# Patient Record
Sex: FEMALE | Race: WHITE | URBAN - METROPOLITAN AREA
[De-identification: names, ages, dates, MRNs, and addresses within clinical notes are randomized per-mention and may not be internally consistent; named-entity substitution may affect disease eponyms.]

---

## 2021-12-01 ENCOUNTER — TELEPHONE (OUTPATIENT)
Dept: TRANSPLANT | Facility: CLINIC | Age: 26
End: 2021-12-01

## 2021-12-01 NOTE — TELEPHONE ENCOUNTER
Initial Independent Living Donor Advocate contact made with potential donor today.  I introduced myself and my role during the donation process, includin.  MARY ROLE   The federal government requires that all licensed transplant centers provide the living donor with an Independent Living Donor Advocate (MARY).  I do not meet recipients or attend meetings that discuss their care or decision to transplant them. My role is separate to avoid any conflict of interest.  My role is to ensure:  1) your rights are protected;  2) you get all the information you need from the transplant team to make a fully informed decision whether to donate;   3) that living donation is in your best interest.   4) that you have the right to decide NOT to go forward with living donation at any time during this process.  I am available to you throughout the workup, during surgery phase and follow-up at home.   2. WORKUP & PRIVACY     Your identity and workup are not shared with the recipient at any time.     There is a medical donor workup that consists of testing to determine if you are healthy enough to donate.  Workup tests include many blood draws, urine collection/ (kidney function testing), chest x-ray, EKG, CT scan. As you complete each step then you may move on to the next.  Workup can take as little or as long as you need and you can stop the process at any time.     Transplant is a treatment option, not a cure. A kidney from a living kidney donor can last 12-14 years.  Other treatment options are  donation and two types of dialysis.     This is major surgery and your estimated hospital stay is approximately 1-2 nights.  After surgery, there are driving and lifting restrictions - no driving for two weeks and no lifting over ten pounds for 6 - 8 weeks.  Donors are routinely off from work for 4 - 6 weeks after surgery, and potentially longer if they have a physical job.       If you anticipate lost wages due to donation,  donor wage reimbursement options may be available to you and will be reviewed with you during the evaluation process.      The recipient's insurance covers the medical expenses related to the donor evaluation and surgery.  However, it is important for you to carry your own health insurance to address any medical issues that are found and are NOT related to living donation.  3.  QUESTIONS  Have you received a packet from the transplant department?  yes   Questions?    Have you discussed with anyone your potential decision to donate?   Yes, with mother and grandmother.  Recipient/father does not know.  Is anyone pressuring or coercing you to donate? No.  Have you discussed any financial arrangements with recipient around donating a kidney? No.  Are you aware that you can confidentially opt out at any time, up to and including day of donation? Yes.  At this time, would you like to proceed with the medical evaluation to see if you can be a kidney donor?  Yes.    If yes, the donor coordinator will be reaching out to you with next steps.     You can reach me or someone else on the MARY team by calling [phone] Option 3.    MARY NOTES: Would like to be evaluated as a potential donor for her father.  He does not know she is considering this.  Highly motivated and an appt with Charla Acosta was scheduled for 12 noon on 12/7.    Duration of call 20

## 2021-12-07 ENCOUNTER — TELEPHONE (OUTPATIENT)
Dept: TRANSPLANT | Facility: CLINIC | Age: 26
End: 2021-12-07

## 2021-12-08 NOTE — TELEPHONE ENCOUNTER
Return call received from Luisana.     Contacted Luisana Coleman to introduce myself and my role, review of medical/surgical/family history and next steps.     Luisana Coleman  is aware She can stop donor evaluation at any time.    Have you ever been positive for COVID 19? no    Have you received the COVID vaccination? yes If yes, when and what brand? Pfizer     Luisana Coleman is a 26 year old female  ABO A that would like to learn more about being a donor to her father Demario Coleman. She is open to paired exchange.      Concerns from medical/surgical/family history: hx anxiety that she reports has been well managed for many years- takes medication and sees a mental health professional    Reviewed any history of travel in endemic areas: [place] only  Strongyloides- [place], [place] and [place].  Trypanosoma cruzi ([place])- [place]  West Nile Virus- [place], [place], [place], [place] and [place].     Per our Phase 1 algorithm, does not meet criteria to do preliminary testing.    Reviewed evaluation testing: Covid PCR, Iohexol, Lab work, CXR, EKG, Provider visits and functions, CT Angiogram.     Reviewed operations of selection committee and angio review meetings and the need for multidisciplinary input.    Luisana plans to talk with her father this weekend about being a potential kidney donor to him. She will call me next week and let me know if she would like to proceed with scheduling evaluation.

## 2022-02-08 ENCOUNTER — TELEPHONE (OUTPATIENT)
Dept: TRANSPLANT | Facility: CLINIC | Age: 27
End: 2022-02-08

## 2022-02-08 NOTE — TELEPHONE ENCOUNTER
Outreach call made to Luisana to see if she is still interested in coming forward as a possible kidney donor, DENA left.